# Patient Record
Sex: MALE | Race: WHITE | NOT HISPANIC OR LATINO | Employment: UNEMPLOYED | URBAN - NONMETROPOLITAN AREA
[De-identification: names, ages, dates, MRNs, and addresses within clinical notes are randomized per-mention and may not be internally consistent; named-entity substitution may affect disease eponyms.]

---
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
## 2024-06-02 NOTE — NURSING NOTE
Verbal consent obtained for evaluation, treatment including any prescribed or Prn medication and admission if needed given by Rodger Damon [phone].   No